# Patient Record
Sex: FEMALE | Race: WHITE | NOT HISPANIC OR LATINO
[De-identification: names, ages, dates, MRNs, and addresses within clinical notes are randomized per-mention and may not be internally consistent; named-entity substitution may affect disease eponyms.]

---

## 2017-10-27 ENCOUNTER — RX ONLY (RX ONLY)
Age: 67
End: 2017-10-27

## 2018-11-29 ENCOUNTER — RX ONLY (RX ONLY)
Age: 68
End: 2018-11-29

## 2018-11-29 RX ORDER — BIMATOPROST 0.3 MG/ML
SOLUTION/ DROPS OPHTHALMIC
Qty: 5 | Refills: 5 | Status: ERX

## 2018-11-29 RX ORDER — BIMATOPROST 0.3 MG/ML
SOLUTION/ DROPS OPHTHALMIC
Qty: 5 | Refills: 5 | Status: ERX | COMMUNITY
Start: 2018-11-29

## 2019-09-19 ENCOUNTER — APPOINTMENT (OUTPATIENT)
Dept: URBAN - METROPOLITAN AREA SURGERY 9 | Age: 69
Setting detail: DERMATOLOGY
End: 2019-09-19

## 2019-09-19 PROBLEM — I63.50 CEREBRAL INFARCTION DUE TO UNSPECIFIED OCCLUSION OR STENOSIS OF UNSPECIFIED CEREBRAL ARTERY: Status: ACTIVE | Noted: 2019-09-19

## 2019-09-19 PROBLEM — D48.5 NEOPLASM OF UNCERTAIN BEHAVIOR OF SKIN: Status: ACTIVE | Noted: 2019-09-19

## 2019-09-19 PROBLEM — K21.9 GASTRO-ESOPHAGEAL REFLUX DISEASE WITHOUT ESOPHAGITIS: Status: ACTIVE | Noted: 2019-09-19

## 2019-09-19 PROCEDURE — OTHER MIPS QUALITY: OTHER

## 2019-09-19 PROCEDURE — 11102 TANGNTL BX SKIN SINGLE LES: CPT

## 2019-09-19 PROCEDURE — OTHER BIOPSY BY SHAVE METHOD: OTHER

## 2019-10-14 ENCOUNTER — APPOINTMENT (OUTPATIENT)
Dept: URBAN - METROPOLITAN AREA SURGERY 9 | Age: 69
Setting detail: DERMATOLOGY
End: 2019-10-14

## 2019-10-14 DIAGNOSIS — L98419 CHRONIC ULCER OF OTHER SPECIFIED SITES: ICD-10-CM

## 2019-10-14 DIAGNOSIS — L98429 CHRONIC ULCER OF OTHER SPECIFIED SITES: ICD-10-CM

## 2019-10-14 PROBLEM — L98.499 NON-PRESSURE CHRONIC ULCER OF SKIN OF OTHER SITES WITH UNSPECIFIED SEVERITY: Status: ACTIVE | Noted: 2019-10-14

## 2019-10-14 PROCEDURE — OTHER TREATMENT REGIMEN: OTHER

## 2019-10-14 PROCEDURE — OTHER OTHER: OTHER

## 2019-10-14 PROCEDURE — 99212 OFFICE O/P EST SF 10 MIN: CPT

## 2019-10-14 ASSESSMENT — LOCATION ZONE DERM: LOCATION ZONE: TRUNK

## 2019-10-14 ASSESSMENT — LOCATION DETAILED DESCRIPTION DERM: LOCATION DETAILED: PERIUMBILICAL SKIN

## 2019-10-14 ASSESSMENT — LOCATION SIMPLE DESCRIPTION DERM: LOCATION SIMPLE: ABDOMEN

## 2019-10-14 NOTE — PROCEDURE: OTHER
Detail Level: Zone
Note Text (......Xxx Chief Complaint.): This diagnosis correlates with the
Other (Free Text): With associated vascular proliferation, see pathology report for full details.

## 2019-10-14 NOTE — PROCEDURE: TREATMENT REGIMEN
Detail Level: Simple
Plan: This is healing well without complication.  No bleeding or other symptoms.  Biopsy results were discussed today.  She is to call if this does not heal, worsens or develops any symptoms.  She verbalized understanding with this, a copy of the pathology report was given to her. She denies any other lesions like this on the body.
Initiate Treatment: Vaseline and bandage until healed.